# Patient Record
Sex: FEMALE | Race: WHITE | URBAN - METROPOLITAN AREA
[De-identification: names, ages, dates, MRNs, and addresses within clinical notes are randomized per-mention and may not be internally consistent; named-entity substitution may affect disease eponyms.]

---

## 2017-01-03 ENCOUNTER — OFFICE VISIT (OUTPATIENT)
Dept: FAMILY MEDICINE CLINIC | Age: 57
End: 2017-01-03

## 2017-01-03 VITALS
RESPIRATION RATE: 20 BRPM | SYSTOLIC BLOOD PRESSURE: 161 MMHG | WEIGHT: 177 LBS | BODY MASS INDEX: 27.78 KG/M2 | DIASTOLIC BLOOD PRESSURE: 89 MMHG | TEMPERATURE: 97.1 F | HEIGHT: 67 IN | HEART RATE: 83 BPM | OXYGEN SATURATION: 98 %

## 2017-01-03 DIAGNOSIS — R10.9 ABDOMINAL PAIN, UNSPECIFIED LOCATION: Primary | ICD-10-CM

## 2017-01-03 DIAGNOSIS — A08.11 ACUTE GASTROENTEROPATHY DUE TO NORWALK AGENT: ICD-10-CM

## 2017-01-03 LAB
BILIRUB UR QL STRIP: NEGATIVE
BILIRUB UR QL STRIP: NEGATIVE
GLUCOSE UR-MCNC: NEGATIVE MG/DL
GLUCOSE UR-MCNC: NEGATIVE MG/DL
KETONES P FAST UR STRIP-MCNC: NEGATIVE MG/DL
KETONES P FAST UR STRIP-MCNC: NEGATIVE MG/DL
PH UR STRIP: 5.5 [PH] (ref 4.6–8)
PH UR STRIP: 5.5 [PH] (ref 4.6–8)
PROT UR QL STRIP: NEGATIVE MG/DL
PROT UR QL STRIP: NEGATIVE MG/DL
SP GR UR STRIP: 1.03 (ref 1–1.03)
SP GR UR STRIP: 1.03 (ref 1–1.03)
UA UROBILINOGEN AMB POC: NORMAL (ref 0.2–1)
UA UROBILINOGEN AMB POC: NORMAL (ref 0.2–1)
URINALYSIS CLARITY POC: CLEAR
URINALYSIS CLARITY POC: CLEAR
URINALYSIS COLOR POC: YELLOW
URINALYSIS COLOR POC: YELLOW
URINE BLOOD POC: NORMAL
URINE BLOOD POC: NORMAL
URINE LEUKOCYTES POC: NEGATIVE
URINE LEUKOCYTES POC: NEGATIVE
URINE NITRITES POC: NEGATIVE
URINE NITRITES POC: NEGATIVE

## 2017-01-03 RX ORDER — DIPHENOXYLATE HYDROCHLORIDE AND ATROPINE SULFATE 2.5; .025 MG/1; MG/1
1 TABLET ORAL
Qty: 14 TAB | Refills: 0 | Status: SHIPPED | OUTPATIENT
Start: 2017-01-03

## 2017-01-03 NOTE — LETTER
1/3/2017 12:27 PM 
 
Ms. Claudio Iha [address] [address] Please excuse from work 12/29/2016-01/05/2017 May return on 01/08/2017 Sincerely, Maine Kaufman MD

## 2017-01-03 NOTE — PROGRESS NOTES
HISTORY OF PRESENT ILLNESS  Nathalia Melton is a 60 y.o. female. HPI  Lower abdominal pain/cramping x 6 days  Some fever, N&V  Some exposure  Review of Systems   Gastrointestinal: Positive for abdominal pain and diarrhea. All other systems reviewed and are negative. Past Medical History   Diagnosis Date    Diverticulosis, colonic      mild    Other primary cardiomyopathies ([facility] [address]      Current Outpatient Prescriptions on File Prior to Visit   Medication Sig Dispense Refill    XOPENEX HFA 45 mcg/actuation inhaler TAKE 2 PUFFS BY MOUTH EVERY 6 HOURS AS NEEDED FOR WHEEZING 1 Inhaler 3    mometasone (ASMANEX TWISTHALER) 220 mcg (30 doses) inhaler Take 1 Puff by inhalation daily. 1 Inhaler 6    lovastatin (MEVACOR) 20 mg tablet Take 1 Tab by mouth nightly. 90 Tab 3    fluticasone (FLONASE) 50 mcg/actuation nasal spray 2 Sprays by Both Nostrils route daily. 3 Bottle 3    amLODIPine (NORVASC) 2.5 mg tablet TAKE 1 TABLET BY MOUTH EVERY DAY 90 tablet 0    calcium 600 mg cap Take  by mouth.  cholecalciferol, vitamin D3, (VITAMIN D3) 2,000 unit Tab Take  by mouth.  ibandronate (BONIVA) 150 mg tablet Take 150 mg by mouth every thirty (30) days.  vitamin e (E GEMS) 1,000 unit capsule Take 1,000 Units by mouth daily.  lansoprazole (PREVACID) 15 mg capsule Take  by mouth daily (before breakfast).  ramipril (ALTACE) 10 mg capsule Take 10 mg by mouth daily.  mexiletine (MEXITIL) 150 mg capsule Take  by mouth three (3) times daily.  aspirin 81 mg chewable tablet Take 81 mg by mouth daily.  docusate sodium (COLACE) 100 mg capsule Take 100 mg by mouth two (2) times a day.  zaleplon (SONATA) 10 mg capsule Take 10 mg by mouth as needed.  ALPRAZolam (XANAX) 0.25 mg tablet Take 1 Tab by mouth nightly as needed for Sleep. Max Daily Amount: 0.25 mg. 90 Tab 1     No current facility-administered medications on file prior to visit.       Visit Vitals    /89 (BP 1 Location: Left arm, BP Patient Position: Sitting)    Pulse 83    Temp 97.1 °F (36.2 °C)    Resp 20    Ht 5' 7\" (1.702 m)    Wt 177 lb (80.3 kg)    SpO2 98%    BMI 27.72 kg/m2         Physical Exam   Constitutional: She appears well-developed and well-nourished. No distress. Abdominal: Soft. She exhibits no distension and no mass. There is tenderness. There is no rebound and no guarding. Tender LLQ  No guarding  BS increased   Skin: She is not diaphoretic. Vitals reviewed.       ASSESSMENT and PLAN  acute gastroenteritis   Plan  Lomotil  Clear liquids

## 2017-01-03 NOTE — MR AVS SNAPSHOT
Visit Information Date & Time Provider Department Dept. Phone Encounter #  
 1/3/2017 11:45 AM Holly Dodge MD [facility] [phone] [id number] Follow-up Instructions Return if symptoms worsen or fail to improve. Upcoming Health Maintenance Date Due Hepatitis C Screening 1960 DTaP/Tdap/Td series (1 - Tdap) 5/20/1979 PAP AKA CERVICAL CYTOLOGY 5/20/1979 BREAST CANCER SCRN MAMMOGRAM 5/20/2008 INFLUENZA AGE 9 TO ADULT 8/1/2016 COLONOSCOPY 10/12/2020 Allergies as of 1/3/2017  Review Complete On: 1/3/2017 By: Holly Dodge MD  
  
 Severity Noted Reaction Type Reactions Coreg [Carvedilol]  03/24/2010    Unknown (comments) Feel like couldn't move Sotalol  03/24/2010    Unknown (comments) Feels sluggish and weak Current Immunizations  Reviewed on 1/20/2011 No immunizations on file. Not reviewed this visit You Were Diagnosed With   
  
 Codes Comments Abdominal pain, unspecified location    -  Primary ICD-10-CM: R10.9 ICD-9-CM: 789.00 Acute gastroenteropathy due to [place] agent     ICD-10-CM: A08.11 ICD-9-CM: 008.63 Vitals BP Pulse Temp Resp Height(growth percentile) Weight(growth percentile) 161/89 (BP 1 Location: Left arm, BP Patient Position: Sitting) 83 97.1 °F (36.2 °C) 20 5' 7\" (1.702 m) 177 lb (80.3 kg) SpO2 BMI OB Status 98% 27.72 kg/m2 Postmenopausal     
 Vitals History BMI and BSA Data Body Mass Index Body Surface Area  
 27.72 kg/m 2 1.95 m 2 Preferred Pharmacy Pharmacy [facility] Phone [facility] [address], [address] [address] [phone] Your Updated Medication List  
  
   
This list is accurate as of: 1/3/17 12:28 PM.  Always use your most recent med list.  
  
  
  
  
 ALPRAZolam 0.25 mg tablet Commonly known as:  Osbaldo Betancur Take 1 Tab by mouth nightly as needed for Sleep. Max Daily Amount: 0.25 mg. ALTACE 10 mg capsule Generic drug:  ramipril Take 10 mg by mouth daily. amLODIPine 2.5 mg tablet Commonly known as:  Rudene Post TAKE 1 TABLET BY MOUTH EVERY DAY  
  
 aspirin 81 mg chewable tablet Take 81 mg by mouth daily. BONIVA 150 mg tablet Generic drug:  ibandronate Take 150 mg by mouth every thirty (30) days. calcium 600 mg Cap Take  by mouth. COLACE 100 mg capsule Generic drug:  docusate sodium Take 100 mg by mouth two (2) times a day. diphenoxylate-atropine 2.5-0.025 mg per tablet Commonly known as:  LOMOTIL Take 1 Tab by mouth four (4) times daily as needed for Diarrhea. Max Daily Amount: 4 Tabs. fluticasone 50 mcg/actuation nasal spray Commonly known as:  Imagene Poot 2 Sprays by Both Nostrils route daily. lovastatin 20 mg tablet Commonly known as:  MEVACOR Take 1 Tab by mouth nightly. mexiletine 150 mg capsule Commonly known as:  MEXITIL Take  by mouth three (3) times daily. mometasone 220 mcg (30 doses) inhaler Commonly known as:  Marie Buddle Take 1 Puff by inhalation daily. PREVACID 15 mg capsule Generic drug:  lansoprazole Take  by mouth daily (before breakfast). SONATA 10 mg capsule Generic drug:  zaleplon Take 10 mg by mouth as needed. VITAMIN D3 2,000 unit Tab Generic drug:  cholecalciferol (vitamin D3) Take  by mouth.  
  
 vitamin e 1,000 unit capsule Commonly known as:  E GEMS Take 1,000 Units by mouth daily. XOPENEX HFA 45 mcg/actuation inhaler Generic drug:  levalbuterol tartrate TAKE 2 PUFFS BY MOUTH EVERY 6 HOURS AS NEEDED FOR WHEEZING Prescriptions Printed Refills diphenoxylate-atropine (LOMOTIL) 2.5-0.025 mg per tablet 0 Sig: Take 1 Tab by mouth four (4) times daily as needed for Diarrhea. Max Daily Amount: 4 Tabs. Class: Print  Route: Oral  
 Follow-up Instructions Return if symptoms worsen or fail to improve. Introducing [facility] & HEALTH SERVICES! Teresa Siegel introduces [employer] patient portal. Now you can access parts of your medical record, email your doctor's office, and request medication refills online. 1. In your internet browser, go to [email or web address] 2. Click on the First Time User? Click Here link in the Sign In box. You will see the New Member Sign Up page. 3. Enter your [employer] Access Code exactly as it appears below. You will not need to use this code after youve completed the sign-up process. If you do not sign up before the expiration date, you must request a new code. · [employer] Access Code: [email or web address] Expires: 1/16/2017  2:23 PM 
 
4. Enter the last four digits of your Social Security Number (xxxx) and Date of Birth (mm/dd/yyyy) as indicated and click Submit. You will be taken to the next sign-up page. 5. Create a [employer] ID. This will be your [employer] login ID and cannot be changed, so think of one that is secure and easy to remember. 6. Create a [employer] password. You can change your password at any time. 7. Enter your Password Reset Question and Answer. This can be used at a later time if you forget your password. 8. Enter your e-mail address. You will receive e-mail notification when new information is available in [address] 9. Click Sign Up. You can now view and download portions of your medical record. 10. Click the Download Summary menu link to download a portable copy of your medical information. If you have questions, please visit the Frequently Asked Questions section of the [employer] website. Remember, [employer] is NOT to be used for urgent needs. For medical emergencies, dial 911. Now available from your iPhone and Android! Please provide this summary of care documentation to your next provider. Your primary care clinician is listed as [place]io [place]. If you have any questions after today's visit, please call [phone].

## 2017-01-03 NOTE — PROGRESS NOTES
Elicia García is a 60 y.o. female   Chief Complaint   Patient presents with    Abdominal Pain     x6 days     1. Have you been to the ER, urgent care clinic since your last visit? Hospitalized since your last visit? No    2. Have you seen or consulted any other health care providers outside of the [facility] since your last visit? Include any pap smears or colon screening.  No

## 2017-03-10 ENCOUNTER — OFFICE VISIT (OUTPATIENT)
Dept: FAMILY MEDICINE CLINIC | Age: 57
End: 2017-03-10

## 2017-03-10 VITALS
RESPIRATION RATE: 18 BRPM | WEIGHT: 181 LBS | HEIGHT: 67 IN | OXYGEN SATURATION: 100 % | SYSTOLIC BLOOD PRESSURE: 149 MMHG | BODY MASS INDEX: 28.41 KG/M2 | HEART RATE: 83 BPM | TEMPERATURE: 97 F | DIASTOLIC BLOOD PRESSURE: 97 MMHG

## 2017-03-10 DIAGNOSIS — H66.001 ACUTE SUPPURATIVE OTITIS MEDIA OF RIGHT EAR WITHOUT SPONTANEOUS RUPTURE OF TYMPANIC MEMBRANE, RECURRENCE NOT SPECIFIED: Primary | ICD-10-CM

## 2017-03-10 RX ORDER — CEFTRIAXONE 500 MG/1
500 INJECTION, POWDER, FOR SOLUTION INTRAMUSCULAR; INTRAVENOUS ONCE
Qty: 500 MG | Refills: 0
Start: 2017-03-10 | End: 2017-03-10

## 2017-03-10 RX ORDER — DEXAMETHASONE SODIUM PHOSPHATE 4 MG/ML
4 INJECTION, SOLUTION INTRA-ARTICULAR; INTRALESIONAL; INTRAMUSCULAR; INTRAVENOUS; SOFT TISSUE ONCE
Qty: 1 VIAL | Refills: 0
Start: 2017-03-10 | End: 2017-03-10

## 2017-03-10 RX ORDER — CEFDINIR 300 MG/1
300 CAPSULE ORAL 2 TIMES DAILY
Qty: 20 CAP | Refills: 0 | Status: SHIPPED | OUTPATIENT
Start: 2017-03-10 | End: 2017-03-20 | Stop reason: SDUPTHER

## 2017-03-10 NOTE — PROGRESS NOTES
Per verbal orders of , injection of dexamethasone 4 mg and ceftriaxone 500mg given by Tori Najjar, LPN. Patient instructed to remain in clinic for 20 minutes afterwards, and to report any adverse reaction to me immediately. Medication documentation completed.

## 2017-03-10 NOTE — PROGRESS NOTES
HISTORY OF PRESENT ILLNESS  Adele Mazariegos is a 60 y.o. female. HPI   lightheaded with headache and congestion  No chest pain or dyspnea, palpitations  Review of Systems   Neurological: Positive for dizziness and headaches. All other systems reviewed and are negative. Past Medical History:   Diagnosis Date    Diverticulosis, colonic     mild    Other primary cardiomyopathies ([facility] [address]      Current Outpatient Prescriptions on File Prior to Visit   Medication Sig Dispense Refill    XOPENEX HFA 45 mcg/actuation inhaler TAKE 2 PUFFS BY MOUTH EVERY 6 HOURS AS NEEDED FOR WHEEZING 1 Inhaler 3    mometasone (ASMANEX TWISTHALER) 220 mcg (30 doses) inhaler Take 1 Puff by inhalation daily. 1 Inhaler 6    ALPRAZolam (XANAX) 0.25 mg tablet Take 1 Tab by mouth nightly as needed for Sleep. Max Daily Amount: 0.25 mg. 90 Tab 1    lovastatin (MEVACOR) 20 mg tablet Take 1 Tab by mouth nightly. 90 Tab 3    fluticasone (FLONASE) 50 mcg/actuation nasal spray 2 Sprays by Both Nostrils route daily. 3 Bottle 3    amLODIPine (NORVASC) 2.5 mg tablet TAKE 1 TABLET BY MOUTH EVERY DAY 90 tablet 0    calcium 600 mg cap Take  by mouth.  cholecalciferol, vitamin D3, (VITAMIN D3) 2,000 unit Tab Take  by mouth.  ibandronate (BONIVA) 150 mg tablet Take 150 mg by mouth every thirty (30) days.  lansoprazole (PREVACID) 15 mg capsule Take  by mouth daily (before breakfast).  ramipril (ALTACE) 10 mg capsule Take 10 mg by mouth daily.  mexiletine (MEXITIL) 150 mg capsule Take  by mouth three (3) times daily.  aspirin 81 mg chewable tablet Take 81 mg by mouth daily.  docusate sodium (COLACE) 100 mg capsule Take 100 mg by mouth two (2) times a day.  zaleplon (SONATA) 10 mg capsule Take 10 mg by mouth as needed.  diphenoxylate-atropine (LOMOTIL) 2.5-0.025 mg per tablet Take 1 Tab by mouth four (4) times daily as needed for Diarrhea. Max Daily Amount: 4 Tabs.  14 Tab 0    vitamin e (E GEMS) 1,000 unit capsule Take 1,000 Units by mouth daily. No current facility-administered medications on file prior to visit. Visit Vitals    BP (!) 149/97 (BP 1 Location: Right arm, BP Patient Position: Sitting)    Pulse 83    Temp 97 °F (36.1 °C) (Oral)    Resp 18    Ht 5' 7\" (1.702 m)    Wt 181 lb (82.1 kg)    SpO2 100%    BMI 28.35 kg/m2         Physical Exam   Constitutional: She appears well-developed and well-nourished. No distress. HENT:   Head: Normocephalic and atraumatic. Left Ear: External ear normal.   Mouth/Throat: No oropharyngeal exudate. TM dull, erythematous   Cardiovascular: Normal rate, regular rhythm, normal heart sounds and intact distal pulses. Exam reveals no gallop and no friction rub. No murmur heard. Pulmonary/Chest: No respiratory distress. She has no wheezes. She has no rales. She exhibits no tenderness. Skin: She is not diaphoretic. Vitals reviewed.       ASSESSMENT and PLAN  acute OM   Plan  Rocephin > cefdinir  Decadron 4 mg im

## 2017-03-10 NOTE — MR AVS SNAPSHOT
Visit Information Date & Time Provider Department Dept. Phone Encounter #  
 3/10/2017 10:15 AM Mahendra Galan MD [address] [place] [address] [phone] [id number] Follow-up Instructions Return if symptoms worsen or fail to improve. Upcoming Health Maintenance Date Due Hepatitis C Screening 1960 DTaP/Tdap/Td series (1 - Tdap) 5/20/1979 PAP AKA CERVICAL CYTOLOGY 5/20/1979 BREAST CANCER SCRN MAMMOGRAM 5/20/2008 INFLUENZA AGE 9 TO ADULT 8/1/2016 COLONOSCOPY 10/12/2020 Allergies as of 3/10/2017  Review Complete On: 3/10/2017 By: Mahendra Galan MD  
  
 Severity Noted Reaction Type Reactions Coreg [Carvedilol]  03/24/2010    Unknown (comments) Feel like couldn't move Sotalol  03/24/2010    Unknown (comments) Feels sluggish and weak Current Immunizations  Reviewed on 1/20/2011 No immunizations on file. Not reviewed this visit You Were Diagnosed With   
  
 Codes Comments Acute suppurative otitis media of right ear without spontaneous rupture of tympanic membrane, recurrence not specified    -  Primary ICD-10-CM: H66.001 ICD-9-CM: 382.00 Vitals BP Pulse Temp Resp Height(growth percentile) Weight(growth percentile) (!) 149/97 (BP 1 Location: Right arm, BP Patient Position: Sitting) 83 97 °F (36.1 °C) (Oral) 18 5' 7\" (1.702 m) 181 lb (82.1 kg) SpO2 BMI OB Status 100% 28.35 kg/m2 Postmenopausal     
  
BMI and BSA Data Body Mass Index Body Surface Area  
 28.35 kg/m 2 1.97 m 2 Preferred Pharmacy Pharmacy Name Phone [facility] [address], [address] [address] [phone] Your Updated Medication List  
  
   
This list is accurate as of: 3/10/17 10:38 AM.  Always use your most recent med list.  
  
  
  
  
 ALPRAZolam 0.25 mg tablet Commonly known as:  [place] Curio Take 1 Tab by mouth nightly as needed for Sleep. Max Daily Amount: 0.25 mg. ALTACE 10 mg capsule Generic drug:  ramipril Take 10 mg by mouth daily. amLODIPine 2.5 mg tablet Commonly known as:  Shawnee Quezada TAKE 1 TABLET BY MOUTH EVERY DAY  
  
 aspirin 81 mg chewable tablet Take 81 mg by mouth daily. BONIVA 150 mg tablet Generic drug:  ibandronate Take 150 mg by mouth every thirty (30) days. calcium 600 mg Cap Take  by mouth. cefdinir 300 mg capsule Commonly known as:  OMNICEF Take 1 Cap by mouth two (2) times a day for 10 days. Start on Saturday  
  
 cefTRIAXone 500 mg injection Commonly known as:  ROCEPHIN  
500 mg by IntraMUSCular route once for 1 dose. COLACE 100 mg capsule Generic drug:  docusate sodium Take 100 mg by mouth two (2) times a day. dexamethasone 4 mg/mL injection Commonly known as:  DECADRON  
1 mL by IntraMUSCular route once for 1 dose. diphenoxylate-atropine 2.5-0.025 mg per tablet Commonly known as:  LOMOTIL Take 1 Tab by mouth four (4) times daily as needed for Diarrhea. Max Daily Amount: 4 Tabs. fluticasone 50 mcg/actuation nasal spray Commonly known as:  Caffie Euler 2 Sprays by Both Nostrils route daily. lovastatin 20 mg tablet Commonly known as:  MEVACOR Take 1 Tab by mouth nightly. mexiletine 150 mg capsule Commonly known as:  MEXITIL Take  by mouth three (3) times daily. mometasone 220 mcg (30 doses) inhaler Commonly known as:  Shelagh [place] Take 1 Puff by inhalation daily. PREVACID 15 mg capsule Generic drug:  lansoprazole Take  by mouth daily (before breakfast). SONATA 10 mg capsule Generic drug:  zaleplon Take 10 mg by mouth as needed. VITAMIN D3 2,000 unit Tab Generic drug:  cholecalciferol (vitamin D3) Take  by mouth.  
  
 vitamin e 1,000 unit capsule Commonly known as:  E GEMS Take 1,000 Units by mouth daily. XOPENEX HFA 45 mcg/actuation inhaler Generic drug:  levalbuterol tartrate TAKE 2 PUFFS BY MOUTH EVERY 6 HOURS AS NEEDED FOR WHEEZING Prescriptions Sent to Pharmacy Refills  
 cefdinir (OMNICEF) 300 mg capsule 0 Sig: Take 1 Cap by mouth two (2) times a day for 10 days. Start on Saturday Class: Normal  
 Pharmacy: [facility], [address] [place] [address] #: [phone] Route: Oral  
  
We Performed the Following CEFTRIAXONE SODIUM INJECTION  MG [ [facility]] Comments:  
 Mix per protocol DEXAMETHASONE SODIUM PHOSPHATE INJECTION 1 MG [ [facility]] TN THER/PROPH/DIAG INJECTION, SUBCUT/IM [id number] CPT(R)] TN THER/PROPH/DIAG INJECTION, SUBCUT/IM [id number] CPT(R)] Follow-up Instructions Return if symptoms worsen or fail to improve. Introducing [facility] & HE[facility] SERVICES! Megan Pastor introduces [employer] patient portal. Now you can access parts of your medical record, email your doctor's office, and request medication refills online. 1. In your internet browser, go to [email or web address] 2. Click on the First Time User? Click Here link in the Sign In box. You will see the New Member Sign Up page. 3. Enter your [employer] Access Code exactly as it appears below. You will not need to use this code after youve completed the sign-up process. If you do not sign up before the expiration date, you must request a new code. · [employer] Access Code: [email or web address] Expires: 6/8/2017 10:38 AM 
 
4. Enter the last four digits of your Social Security Number (xxxx) and Date of Birth (mm/dd/yyyy) as indicated and click Submit. You will be taken to the next sign-up page. 5. Create a [employer] ID. This will be your [employer] login ID and cannot be changed, so think of one that is secure and easy to remember. 6. Create a [employer] password. You can change your password at any time. 7. Enter your Password Reset Question and Answer. This can be used at a later time if you forget your password. 8. Enter your e-mail address. You will receive e-mail notification when new information is available in [address] 9. Click Sign Up. You can now view and download portions of your medical record. 10. Click the Download Summary menu link to download a portable copy of your medical information. If you have questions, please visit the Frequently Asked Questions section of the [employer] website. Remember, [employer] is NOT to be used for urgent needs. For medical emergencies, dial 911. Now available from your iPhone and Android! Please provide this summary of care documentation to your next provider. Your primary care clinician is listed as [facility]. If you have any questions after today's visit, please call [phone].

## 2017-03-10 NOTE — PROGRESS NOTES
Chief Complaint   Patient presents with    Hypertension    Dizziness     Pain scale 0/10    1. Have you been to the ER, urgent care clinic since your last visit? Hospitalized since your last visit? No    2. Have you seen or consulted any other health care providers outside of the [address] [place] [address] since your last visit? Include any pap smears or colon screening.  No

## 2017-03-16 NOTE — TELEPHONE ENCOUNTER
Symptoms seem to be worsening. Wanted afternoon appointment for Friday if possible or advise w/o appointment. Please contact back to advise.

## 2017-03-17 NOTE — TELEPHONE ENCOUNTER
Patient called back,   -97  Still hurting in her ear, \"when ever I bend head down or up get more lightheaded\"  She states she doesn't seem any better. She bent over to pick something up and almost fell out from the dizziness. Also drowsiness now, that she thinks may be from the ATB.

## 2017-03-17 NOTE — TELEPHONE ENCOUNTER
What symptoms, dizziness, earache, headache, other?   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

## 2017-03-20 RX ORDER — METHYLPREDNISOLONE 4 MG/1
TABLET ORAL
Qty: 1 DOSE PACK | Refills: 0 | Status: SHIPPED | OUTPATIENT
Start: 2017-03-20

## 2017-03-20 RX ORDER — CEFDINIR 300 MG/1
300 CAPSULE ORAL 2 TIMES DAILY
Qty: 20 CAP | Refills: 0 | Status: SHIPPED | OUTPATIENT
Start: 2017-03-20 | End: 2017-03-30

## 2017-05-04 RX ORDER — ALPRAZOLAM 0.25 MG/1
TABLET ORAL
Qty: 90 TAB | Refills: 0 | Status: SHIPPED | OUTPATIENT
Start: 2017-05-04 | End: 2017-05-14 | Stop reason: SDUPTHER

## 2017-05-04 RX ORDER — FLUTICASONE PROPIONATE 50 MCG
SPRAY, SUSPENSION (ML) NASAL
Qty: 1 BOTTLE | Refills: 3 | Status: SHIPPED | OUTPATIENT
Start: 2017-05-04 | End: 2017-05-04 | Stop reason: SDUPTHER

## 2017-05-05 RX ORDER — FLUTICASONE PROPIONATE 50 MCG
SPRAY, SUSPENSION (ML) NASAL
Qty: 1 BOTTLE | Refills: 3 | Status: SHIPPED | OUTPATIENT
Start: 2017-05-05 | End: 2018-05-24 | Stop reason: SDUPTHER

## 2017-05-15 RX ORDER — ALPRAZOLAM 0.25 MG/1
TABLET ORAL
Qty: 90 TAB | Refills: 0 | OUTPATIENT
Start: 2017-05-15 | End: 2017-12-21 | Stop reason: SDUPTHER

## 2017-07-20 RX ORDER — LOVASTATIN 20 MG/1
TABLET ORAL
Qty: 90 TAB | Refills: 0 | Status: SHIPPED | OUTPATIENT
Start: 2017-07-20 | End: 2018-10-04 | Stop reason: SDUPTHER

## 2017-09-29 ENCOUNTER — OFFICE VISIT (OUTPATIENT)
Dept: FAMILY MEDICINE CLINIC | Age: 57
End: 2017-09-29

## 2017-09-29 VITALS — WEIGHT: 183 LBS | HEIGHT: 67 IN | BODY MASS INDEX: 28.72 KG/M2

## 2017-09-29 DIAGNOSIS — Z00.00 ROUTINE GENERAL MEDICAL EXAMINATION AT A HEALTH CARE FACILITY: Primary | ICD-10-CM

## 2017-09-29 DIAGNOSIS — H66.91 ACUTE OTITIS MEDIA, RIGHT: Primary | ICD-10-CM

## 2017-09-29 DIAGNOSIS — M79.671 RIGHT FOOT PAIN: ICD-10-CM

## 2017-09-29 DIAGNOSIS — H60.511 ACUTE ACTINIC OTITIS EXTERNA OF RIGHT EAR: ICD-10-CM

## 2017-09-29 DIAGNOSIS — Z00.00 ROUTINE GENERAL MEDICAL EXAMINATION AT A HEALTH CARE FACILITY: ICD-10-CM

## 2017-09-29 DIAGNOSIS — H61.21 IMPACTED CERUMEN OF RIGHT EAR: ICD-10-CM

## 2017-09-29 DIAGNOSIS — H61.21 HEARING LOSS DUE TO CERUMEN IMPACTION, RIGHT: ICD-10-CM

## 2017-09-29 RX ORDER — NEOMYCIN SULFATE, POLYMYXIN B SULFATE AND HYDROCORTISONE 10; 3.5; 1 MG/ML; MG/ML; [USP'U]/ML
5 SUSPENSION/ DROPS AURICULAR (OTIC) 3 TIMES DAILY
Qty: 10 ML | Refills: 0 | Status: SHIPPED | OUTPATIENT
Start: 2017-09-29 | End: 2017-10-09

## 2017-09-29 RX ORDER — AMOXICILLIN AND CLAVULANATE POTASSIUM 875; 125 MG/1; MG/1
1 TABLET, FILM COATED ORAL 2 TIMES DAILY
Qty: 20 TAB | Refills: 0 | Status: SHIPPED | OUTPATIENT
Start: 2017-09-29 | End: 2017-10-09

## 2017-09-29 NOTE — PROGRESS NOTES
Peewee Mcdonald is a 59 y.o. female  Pt here today for right ear and foot pain. 1. Have you been to the ER, urgent care clinic or hospitalized since your last visit? NO.     2. Have you seen or consulted any other health care providers outside of the [address] [place] [address] since your last visit (Include any pap smears or colon screening)? NO      Do you have an Advanced Directive? YES    Would you like information on Advanced Directives?  NO

## 2017-09-29 NOTE — MR AVS SNAPSHOT
Visit Information Date & Time Provider Department Dept. Phone Encounter #  
 9/29/2017  3:00 PM Ivelisse Dupree MD [facility] [phone] [id number] Follow-up Instructions Return if symptoms worsen or fail to improve. Follow-up and Disposition History Upcoming Health Maintenance Date Due Hepatitis C Screening 1960 DTaP/Tdap/Td series (1 - Tdap) 5/20/1979 PAP AKA CERVICAL CYTOLOGY 5/20/1979 BREAST CANCER SCRN MAMMOGRAM 5/20/2008 INFLUENZA AGE 9 TO ADULT 8/1/2017 COLONOSCOPY 10/12/2020 Allergies as of 9/29/2017  Review Complete On: 9/29/2017 By: Ivelisse Dupree MD  
  
 Severity Noted Reaction Type Reactions Coreg [Carvedilol]  03/24/2010    Unknown (comments) Feel like couldn't move Sotalol  03/24/2010    Unknown (comments) Feels sluggish and weak Current Immunizations  Reviewed on 1/20/2011 No immunizations on file. Not reviewed this visit You Were Diagnosed With   
  
 Codes Comments Acute otitis media, right    -  Primary ICD-10-CM: H66.91 
ICD-9-CM: 382. 9 Right foot pain     ICD-10-CM: M79.671 ICD-9-CM: 729.5 Hearing loss due to cerumen impaction, right     ICD-10-CM: H61.21 ICD-9-CM: 389.8, 380.4 Impacted cerumen of right ear     ICD-10-CM: H61.21 ICD-9-CM: 380.4 Acute actinic otitis externa of right ear     ICD-10-CM: H60.511 ICD-9-CM: 380.22 Vitals Height(growth percentile) Weight(growth percentile) BMI OB Status 5' 7\" (1.702 m) 183 lb (83 kg) 28.66 kg/m2 Postmenopausal    
  
BMI and BSA Data Body Mass Index Body Surface Area  
 28.66 kg/m 2 1.98 m 2 Preferred Pharmacy Pharmacy Name Phone [facility] [address], [address] [place] [address] [phone] Your Updated Medication List  
  
   
This list is accurate as of: 9/29/17  3:59 PM.  Always use your most recent med list.  
  
  
  
  
 ALPRAZolam 0.25 mg tablet Commonly known as:  XANAX  
TAKE 1 TABLET BY MOUTH NIGHTLY AS NEEDED FOR SLEEP  
  
 ALTACE 10 mg capsule Generic drug:  ramipril Take 10 mg by mouth daily. amLODIPine 2.5 mg tablet Commonly known as:  Edvin Kierra TAKE 1 TABLET BY MOUTH EVERY DAY  
  
 amoxicillin-clavulanate 875-125 mg per tablet Commonly known as:  AUGMENTIN Take 1 Tab by mouth two (2) times a day for 10 days. aspirin 81 mg chewable tablet Take 81 mg by mouth daily. BONIVA 150 mg tablet Generic drug:  ibandronate Take 150 mg by mouth every thirty (30) days. calcium 600 mg Cap Take  by mouth. COLACE 100 mg capsule Generic drug:  docusate sodium Take 100 mg by mouth two (2) times a day. diphenoxylate-atropine 2.5-0.025 mg per tablet Commonly known as:  LOMOTIL Take 1 Tab by mouth four (4) times daily as needed for Diarrhea. Max Daily Amount: 4 Tabs. fluticasone 50 mcg/actuation nasal spray Commonly known as:  Marine Knack SHAKE LIQUID AND USE 2 SPRAYS IN EACH NOSTRIL DAILY lovastatin 20 mg tablet Commonly known as:  MEVACOR  
TAKE 1 TABLET BY MOUTH EVERY EVENING  
  
 methylPREDNISolone 4 mg tablet Commonly known as:  Ck Banister Follow directions on the box. mexiletine 150 mg capsule Commonly known as:  MEXITIL Take  by mouth three (3) times daily. mometasone 220 mcg (30 doses) inhaler Commonly known as:  Lovina Hand Take 1 Puff by inhalation daily. neomycin-polymyxin-hydrocortisone (buffered) 3.5-10,000-1 mg/mL-unit/mL-% otic suspension Commonly known as:  Carey Grice Administer 5 Drops in right ear three (3) times daily for 10 days. PREVACID 15 mg capsule Generic drug:  lansoprazole Take  by mouth daily (before breakfast). SONATA 10 mg capsule Generic drug:  zaleplon Take 10 mg by mouth as needed. VITAMIN D3 2,000 unit Tab Generic drug:  cholecalciferol (vitamin D3) Take  by mouth.  
  
 vitamin e 1,000 unit capsule Commonly known as:  E GEMS Take 1,000 Units by mouth daily. XOPENEX HFA 45 mcg/actuation inhaler Generic drug:  levalbuterol tartrate TAKE 2 PUFFS BY MOUTH EVERY 6 HOURS AS NEEDED FOR WHEEZING Prescriptions Sent to Pharmacy Refills  
 amoxicillin-clavulanate (AUGMENTIN) 875-125 mg per tablet 0 Sig: Take 1 Tab by mouth two (2) times a day for 10 days. Class: Normal  
 Pharmacy: [facility] [address] [facility], [address] [place] [address] Ph #: [phone] Route: Oral  
 neomycin-polymyxin-hydrocortisone, buffered, (PEDIOTIC) 3.5-10,000-1 mg/mL-unit/mL-% otic suspension 0 Sig: Administer 5 Drops in right ear three (3) times daily for 10 days. Class: Normal  
 Pharmacy: [facility] [address] [facility], [address] [place] [address] Ph #: [phone] Route: Right Ear We Performed the Following VT REMOVE IMPACTED EAR WAX [60689 CPT(R)] Follow-up Instructions Return if symptoms worsen or fail to improve. To-Do List   
 09/29/2017 Imaging:  XR FOOT RT MIN 3 V Introducing [facility]! Daniel Arthur introduces [employer] patient portal. Now you can access parts of your medical record, email your doctor's office, and request medication refills online. 1. In your internet browser, go to [email or web address] 2. Click on the First Time User? Click Here link in the Sign In box. You will see the New Member Sign Up page. 3. Enter your [employer] Access Code exactly as it appears below. You will not need to use this code after youve completed the sign-up process. If you do not sign up before the expiration date, you must request a new code. · [employer] Access Code: [email or web address] Expires: 12/28/2017  3:59 PM 
 
 4. Enter the last four digits of your Social Security Number (xxxx) and Date of Birth (mm/dd/yyyy) as indicated and click Submit. You will be taken to the next sign-up page. 5. Create a [facility] [employer] [facility] ID. This will be your [facility] [facility] [facility] login ID and cannot be changed, so think of one that is secure and easy to remember. 6. Create a [facility] [employer] [facility] [facility] password. You can change your password at any time. 7. Enter your Password Reset Question and Answer. This can be used at a later time if you forget your password. 8. Enter your e-mail address. You will receive e-mail notification when new information is available in [address] 9. Click Sign Up. You can now view and download portions of your medical record. 10. Click the Download Summary menu link to download a portable copy of your medical information. If you have questions, please visit the Frequently Asked Questions section of the [facility] [facility] [employer] [facility] website. Remember, [facility] [facility] [facility] is NOT to be used for urgent needs. For medical emergencies, dial 911. Now available from your iPhone and Android! Please provide this summary of care documentation to your next provider. Your primary care clinician is listed as Summer Barnes. If you have any questions after today's visit, please call [phone].

## 2017-09-29 NOTE — PROGRESS NOTES
HISTORY OF PRESENT ILLNESS  Zen Szymanski is a 59 y.o. female. HPI  r ear congestion  C/o pain r foot  Review of Systems   HENT: Positive for congestion. Musculoskeletal: Positive for joint pain. All other systems reviewed and are negative. Past Medical History:   Diagnosis Date    Diverticulosis, colonic     mild    Other primary cardiomyopathies      Current Outpatient Prescriptions on File Prior to Visit   Medication Sig Dispense Refill    lovastatin (MEVACOR) 20 mg tablet TAKE 1 TABLET BY MOUTH EVERY EVENING 90 Tab 0    ALPRAZolam (XANAX) 0.25 mg tablet TAKE 1 TABLET BY MOUTH NIGHTLY AS NEEDED FOR SLEEP 90 Tab 0    fluticasone (FLONASE) 50 mcg/actuation nasal spray SHAKE LIQUID AND USE 2 SPRAYS IN EACH NOSTRIL DAILY 1 Bottle 3    methylPREDNISolone (MEDROL DOSEPACK) 4 mg tablet Follow directions on the box. 1 Dose Pack 0    diphenoxylate-atropine (LOMOTIL) 2.5-0.025 mg per tablet Take 1 Tab by mouth four (4) times daily as needed for Diarrhea. Max Daily Amount: 4 Tabs. 14 Tab 0    XOPENEX HFA 45 mcg/actuation inhaler TAKE 2 PUFFS BY MOUTH EVERY 6 HOURS AS NEEDED FOR WHEEZING 1 Inhaler 3    mometasone (ASMANEX TWISTHALER) 220 mcg (30 doses) inhaler Take 1 Puff by inhalation daily. 1 Inhaler 6    amLODIPine (NORVASC) 2.5 mg tablet TAKE 1 TABLET BY MOUTH EVERY DAY 90 tablet 0    calcium 600 mg cap Take  by mouth.  cholecalciferol, vitamin D3, (VITAMIN D3) 2,000 unit Tab Take  by mouth.  ibandronate (BONIVA) 150 mg tablet Take 150 mg by mouth every thirty (30) days.  vitamin e (E GEMS) 1,000 unit capsule Take 1,000 Units by mouth daily.  lansoprazole (PREVACID) 15 mg capsule Take  by mouth daily (before breakfast).  ramipril (ALTACE) 10 mg capsule Take 10 mg by mouth daily.  mexiletine (MEXITIL) 150 mg capsule Take  by mouth three (3) times daily.  aspirin 81 mg chewable tablet Take 81 mg by mouth daily.       docusate sodium (COLACE) 100 mg capsule Take 100 mg by mouth two (2) times a day.  zaleplon (SONATA) 10 mg capsule Take 10 mg by mouth as needed. No current facility-administered medications on file prior to visit. Visit Vitals    Ht 5' 7\" (1.702 m)    Wt 183 lb (83 kg)    BMI 28.66 kg/m2         Physical Exam   Constitutional: She appears well-developed and well-nourished. No distress. HENT:   Head: Normocephalic and atraumatic.   r tm cerumen   Musculoskeletal: Normal range of motion. She exhibits tenderness and deformity. She exhibits no edema. Bone spur r foot laterally   Skin: She is not diaphoretic. Vitals reviewed. ASSESSMENT and PLAN  acute otitis media/externa   Plan  augmentin/cortisporin  Subjective:     Joo Palacios is a 59 y.o. female who presents for evaluation of a plugged ear. She has noticed right symptoms 1 week ago. There is no a prior history of cerumen impaction. Patient has ear pain. Patient has hearing loss. Objective:     Visit Vitals    Ht 5' 7\" (1.702 m)    Wt 183 lb (83 kg)    BMI 28.66 kg/m2       General: alert, cooperative, no distress   Right Ear: ceruminosis noted. Left Ear:  ceruminosis noted. After removal: bilateral TM's and external ear canals normal, cerumen removed. Oropharynx:   mild erythema and bulging r tm. Neck:  supple, symmetrical, trachea midline and no adenopathy. Assessment/Plan:     Cerumen Impaction, with otitis externa. 1. Cerumen removed by flushing. 2. Care instructions given. 3. Home treatment: Cortisporin, Augmentin  4. Followup as needed. current treatment plan is effective, no change in therapy.

## 2017-10-14 LAB
25(OH)D3 SERPL-MCNC: 45.4 NG/ML (ref 32–100)
A-G RATIO,AGRAT: 1.7 RATIO (ref 1.1–2.6)
ABSOLUTE LYMPHOCYTE COUNT, 10803: 1.9 K/UL (ref 1–4.8)
ALBUMIN SERPL-MCNC: 4.3 G/DL (ref 3.5–5)
ALP SERPL-CCNC: 94 U/L (ref 25–115)
ALT SERPL-CCNC: 21 U/L (ref 5–40)
ANION GAP SERPL CALC-SCNC: 19 MMOL/L
AST SERPL W P-5'-P-CCNC: 13 U/L (ref 10–37)
BASOPHILS # BLD: 0 K/UL (ref 0–0.2)
BASOPHILS NFR BLD: 1 % (ref 0–2)
BILIRUB SERPL-MCNC: 0.7 MG/DL (ref 0.2–1.2)
BUN SERPL-MCNC: 20 MG/DL (ref 6–22)
CALCIUM SERPL-MCNC: 9.3 MG/DL (ref 8.4–10.5)
CHLORIDE SERPL-SCNC: 98 MMOL/L (ref 98–110)
CHOLEST SERPL-MCNC: 227 MG/DL (ref 110–200)
CO2 SERPL-SCNC: 23 MMOL/L (ref 20–32)
CREAT SERPL-MCNC: 0.9 MG/DL (ref 0.5–1.2)
EOSINOPHIL # BLD: 0.2 K/UL (ref 0–0.5)
EOSINOPHIL NFR BLD: 3 % (ref 0–6)
ERYTHROCYTE [DISTWIDTH] IN BLOOD BY AUTOMATED COUNT: 12.2 % (ref 10–16)
GFRAA, 66117: >60
GFRNA, 66118: >60
GLOBULIN,GLOB: 2.6 G/DL (ref 2–4)
GLUCOSE SERPL-MCNC: 100 MG/DL (ref 70–99)
GRANULOCYTES,GRANS: 59 % (ref 40–75)
HCT VFR BLD AUTO: 41 % (ref 35.1–48)
HDLC SERPL-MCNC: 68 MG/DL (ref 40–59)
HGB BLD-MCNC: 13.5 G/DL (ref 11.7–16)
LDLC SERPL CALC-MCNC: 97 MG/DL (ref 50–99)
LYMPHOCYTES, LYMLT: 32 % (ref 27–45)
MCH RBC QN AUTO: 30 PG (ref 26–34)
MCHC RBC AUTO-ENTMCNC: 33 G/DL (ref 32–36)
MCV RBC AUTO: 91 FL (ref 80–95)
MONOCYTES # BLD: 0.3 K/UL (ref 0.1–0.9)
MONOCYTES NFR BLD: 6 % (ref 3–9)
NEUTROPHILS # BLD AUTO: 3.6 K/UL (ref 1.8–7.7)
PLATELET # BLD AUTO: 216 K/UL (ref 140–440)
PMV BLD AUTO: 10.4 FL (ref 6–10.8)
POTASSIUM SERPL-SCNC: 4.7 MMOL/L (ref 3.5–5.5)
PROT SERPL-MCNC: 6.9 G/DL (ref 6.4–8.3)
RBC # BLD AUTO: 4.5 M/UL (ref 3.8–5.2)
SODIUM SERPL-SCNC: 140 MMOL/L (ref 133–145)
T3FREE SERPL-MCNC: 2.8 PG/ML (ref 2.3–4.2)
T4 FREE SERPL-MCNC: 1 NG/DL (ref 0.9–1.8)
TRIGL SERPL-MCNC: 311 MG/DL (ref 40–149)
TSH SERPL DL<=0.005 MIU/L-ACNC: 1.14 MCU/ML (ref 0.27–4.2)
VLDLC SERPL CALC-MCNC: 62 MG/DL (ref 8–30)
WBC # BLD AUTO: 6 K/UL (ref 4–11)

## 2017-10-16 RX ORDER — LOVASTATIN 20 MG/1
TABLET ORAL
Qty: 90 TAB | Refills: 0 | Status: SHIPPED | OUTPATIENT
Start: 2017-10-16 | End: 2018-10-04 | Stop reason: SDUPTHER

## 2017-10-17 ENCOUNTER — TELEPHONE (OUTPATIENT)
Dept: FAMILY MEDICINE CLINIC | Age: 57
End: 2017-10-17

## 2017-10-17 NOTE — TELEPHONE ENCOUNTER
Pt called stating she has hand surgery scheduled for next Wednesday with Dr. Caprice Carlton at [place] [facility] [address] [facility]. She is requesting that Dr. Korey Stock send over a letter stating that she is okay for surgery. Pt states she was seen two weeks ago for something un related but would like to know if that appt could be used as clearance for her surgery.

## 2017-10-17 NOTE — LETTER
10/17/2017 3:46 PM 
 
Ms. Wilfrido Aaron [address] [id number] Ms Keily Payne is medically cleared for her wrist surgery as planned. Sincerely, Rin De La Rosa MD

## 2018-01-17 RX ORDER — LOVASTATIN 20 MG/1
TABLET ORAL
Qty: 90 TAB | Refills: 0 | Status: SHIPPED | OUTPATIENT
Start: 2018-01-17 | End: 2018-10-04 | Stop reason: SDUPTHER

## 2018-02-19 RX ORDER — LEVALBUTEROL TARTRATE 45 UG/1
AEROSOL, METERED ORAL
Qty: 1 INHALER | Refills: 3 | Status: SHIPPED | OUTPATIENT
Start: 2018-02-19 | End: 2018-02-20 | Stop reason: SDUPTHER

## 2018-02-20 RX ORDER — LEVALBUTEROL TARTRATE 45 UG/1
AEROSOL, METERED ORAL
Qty: 3 INHALER | Refills: 3 | Status: SHIPPED | OUTPATIENT
Start: 2018-02-20

## 2018-05-06 DIAGNOSIS — F41.9 CHRONIC ANXIETY: ICD-10-CM

## 2018-05-06 RX ORDER — ALPRAZOLAM 0.25 MG/1
TABLET ORAL
Qty: 90 TAB | Refills: 0 | OUTPATIENT
Start: 2018-05-06

## 2018-05-25 RX ORDER — FLUTICASONE PROPIONATE 50 MCG
SPRAY, SUSPENSION (ML) NASAL
Qty: 1 BOTTLE | Refills: 3 | Status: SHIPPED | OUTPATIENT
Start: 2018-05-25

## 2018-05-25 RX ORDER — FLUTICASONE PROPIONATE 50 MCG
SPRAY, SUSPENSION (ML) NASAL
Qty: 1 BOTTLE | Refills: 0 | Status: SHIPPED | OUTPATIENT
Start: 2018-05-25 | End: 2018-05-25 | Stop reason: SDUPTHER

## 2018-05-28 RX ORDER — LOVASTATIN 20 MG/1
TABLET ORAL
Qty: 90 TAB | Refills: 0 | Status: SHIPPED | OUTPATIENT
Start: 2018-05-28 | End: 2018-10-04 | Stop reason: SDUPTHER

## 2018-10-04 RX ORDER — FLUTICASONE PROPIONATE 50 MCG
SPRAY, SUSPENSION (ML) NASAL
Refills: 0 | OUTPATIENT
Start: 2018-10-04

## 2018-10-04 RX ORDER — LOVASTATIN 20 MG/1
TABLET ORAL
Qty: 30 TAB | Refills: 0 | Status: SHIPPED | OUTPATIENT
Start: 2018-10-04

## 2018-11-15 RX ORDER — ALPRAZOLAM 0.5 MG/1
TABLET ORAL
Qty: 90 TAB | Refills: 0 | OUTPATIENT
Start: 2018-11-15